# Patient Record
Sex: FEMALE | Race: WHITE | Employment: PART TIME | URBAN - METROPOLITAN AREA
[De-identification: names, ages, dates, MRNs, and addresses within clinical notes are randomized per-mention and may not be internally consistent; named-entity substitution may affect disease eponyms.]

---

## 2019-12-30 ENCOUNTER — HOSPITAL ENCOUNTER (OUTPATIENT)
Age: 29
Discharge: HOME | End: 2019-12-30
Attending: EMERGENCY MEDICINE
Payer: MEDICAID

## 2019-12-30 VITALS
DIASTOLIC BLOOD PRESSURE: 64 MMHG | HEIGHT: 62 IN | WEIGHT: 100 LBS | SYSTOLIC BLOOD PRESSURE: 107 MMHG | BODY MASS INDEX: 18.4 KG/M2 | RESPIRATION RATE: 18 BRPM | TEMPERATURE: 99 F | OXYGEN SATURATION: 95 % | HEART RATE: 63 BPM

## 2019-12-30 DIAGNOSIS — M54.16 LUMBAR RADICULOPATHY: Primary | ICD-10-CM

## 2019-12-30 PROCEDURE — 99203 OFFICE O/P NEW LOW 30 MIN: CPT

## 2019-12-30 PROCEDURE — 99204 OFFICE O/P NEW MOD 45 MIN: CPT

## 2019-12-30 RX ORDER — IBUPROFEN 600 MG/1
600 TABLET ORAL ONCE
Status: COMPLETED | OUTPATIENT
Start: 2019-12-30 | End: 2019-12-30

## 2019-12-30 RX ORDER — HYDROCODONE BITARTRATE AND ACETAMINOPHEN 5; 325 MG/1; MG/1
1-2 TABLET ORAL EVERY 6 HOURS PRN
Qty: 10 TABLET | Refills: 0 | Status: SHIPPED | OUTPATIENT
Start: 2019-12-30 | End: 2020-01-02

## 2019-12-30 RX ORDER — PREDNISONE 20 MG/1
60 TABLET ORAL DAILY
Qty: 15 TABLET | Refills: 0 | Status: SHIPPED | OUTPATIENT
Start: 2019-12-30 | End: 2020-01-04

## 2019-12-30 RX ORDER — HYDROCODONE BITARTRATE AND ACETAMINOPHEN 5; 325 MG/1; MG/1
1-2 TABLET ORAL EVERY 6 HOURS PRN
Qty: 10 TABLET | Refills: 0 | Status: SHIPPED | OUTPATIENT
Start: 2019-12-30 | End: 2019-12-30

## 2019-12-30 RX ORDER — LIDOCAINE 50 MG/G
1 PATCH TOPICAL EVERY 24 HOURS
Qty: 30 PATCH | Refills: 0 | Status: SHIPPED | OUTPATIENT
Start: 2019-12-30 | End: 2020-01-29

## 2019-12-30 RX ORDER — IBUPROFEN 600 MG/1
600 TABLET ORAL ONCE
Status: DISCONTINUED | OUTPATIENT
Start: 2019-12-30 | End: 2019-12-30

## 2019-12-30 RX ORDER — HYDROCODONE BITARTRATE AND ACETAMINOPHEN 5; 325 MG/1; MG/1
2 TABLET ORAL ONCE
Status: COMPLETED | OUTPATIENT
Start: 2019-12-30 | End: 2019-12-30

## 2019-12-30 RX ORDER — LIDOCAINE 50 MG/G
1 PATCH TOPICAL EVERY 24 HOURS
Qty: 30 PATCH | Refills: 0 | Status: SHIPPED | OUTPATIENT
Start: 2019-12-30 | End: 2019-12-30

## 2019-12-30 RX ORDER — PREDNISONE 20 MG/1
60 TABLET ORAL DAILY
Qty: 15 TABLET | Refills: 0 | Status: SHIPPED | OUTPATIENT
Start: 2019-12-30 | End: 2019-12-30

## 2019-12-30 RX ORDER — HYDROCODONE BITARTRATE AND ACETAMINOPHEN 5; 325 MG/1; MG/1
2 TABLET ORAL ONCE
Status: DISCONTINUED | OUTPATIENT
Start: 2019-12-30 | End: 2019-12-30

## 2019-12-30 RX ORDER — CYCLOBENZAPRINE HCL 10 MG
10 TABLET ORAL 3 TIMES DAILY PRN
Qty: 20 TABLET | Refills: 0 | Status: SHIPPED | OUTPATIENT
Start: 2019-12-30 | End: 2020-01-09

## 2019-12-30 RX ORDER — CYCLOBENZAPRINE HCL 10 MG
10 TABLET ORAL 3 TIMES DAILY PRN
Qty: 20 TABLET | Refills: 0 | Status: SHIPPED | OUTPATIENT
Start: 2019-12-30 | End: 2019-12-30

## 2019-12-30 NOTE — ED PROVIDER NOTES
Patient Seen in: [facility]      History   Patient presents with:  Back Pain    Stated Complaint: Severe back pain (unable to move)    HPI    Patient is a 56-year-old female comes the ED for evaluation of low back pain.   Pa discomfort due to pain  LUNGS: Clear to auscultation bilaterally  CARDIOVASCULAR: Regular rate and rhythm, normal S1-S2  ABDOMINAL: Soft, nontender, nondistended  SKIN: Warm and dry  Back: Patient has some mild midline tenderness at L4/L5.   She has left-si determined, within reasonable clinical confidence and prior to discharge, that an emergency medical condition was not or was no longer present. There was no indication for further evaluation, treatment or admission on an emergency basis.   Comprehensive ve

## 2019-12-30 NOTE — ED INITIAL ASSESSMENT (HPI)
States she was at work yesterday carrying a drawer of money and when she put the drawer down, she felt a tightness in her lower back. L side > R side. C/o intermittent left leg numbness when she tries to walk. States her leg goes \"dead\".

## 2020-07-16 ENCOUNTER — TELEPHONE (OUTPATIENT)
Dept: SCHEDULING | Age: 30
End: 2020-07-16

## 2022-11-14 ENCOUNTER — APPOINTMENT (OUTPATIENT)
Dept: URGENT CARE | Age: 32
End: 2022-11-14

## 2022-11-30 ENCOUNTER — HOSPITAL ENCOUNTER (OUTPATIENT)
Age: 32
Discharge: HOME | End: 2022-11-30
Payer: COMMERCIAL

## 2022-11-30 VITALS
HEART RATE: 60 BPM | OXYGEN SATURATION: 98 % | RESPIRATION RATE: 16 BRPM | DIASTOLIC BLOOD PRESSURE: 78 MMHG | TEMPERATURE: 99 F | SYSTOLIC BLOOD PRESSURE: 106 MMHG

## 2022-11-30 DIAGNOSIS — K08.89 PAIN, DENTAL: Primary | ICD-10-CM

## 2022-11-30 PROCEDURE — 99203 OFFICE O/P NEW LOW 30 MIN: CPT | Performed by: NURSE PRACTITIONER

## 2022-11-30 RX ORDER — HYDROCODONE BITARTRATE AND ACETAMINOPHEN 5; 325 MG/1; MG/1
1 TABLET ORAL EVERY 6 HOURS PRN
Qty: 10 TABLET | Refills: 0 | Status: SHIPPED | OUTPATIENT
Start: 2022-11-30

## 2022-11-30 RX ORDER — NAPROXEN 500 MG/1
500 TABLET ORAL 2 TIMES DAILY PRN
Qty: 20 TABLET | Refills: 0 | Status: SHIPPED | OUTPATIENT
Start: 2022-11-30 | End: 2022-12-07

## 2022-11-30 RX ORDER — CLINDAMYCIN HYDROCHLORIDE 300 MG/1
300 CAPSULE ORAL 3 TIMES DAILY
Qty: 30 CAPSULE | Refills: 0 | Status: SHIPPED | OUTPATIENT
Start: 2022-11-30 | End: 2022-12-10

## 2022-11-30 NOTE — ED INITIAL ASSESSMENT (HPI)
Pt has a cracked tooth to her right lower jaw. Tooth #30. Pt has had this for years and needs a root canal. Pain started yesterday. Pt can't close her mouth or eat due to pain. Seeing a dentist this weekend.

## 2024-02-28 ENCOUNTER — HOSPITAL ENCOUNTER (OUTPATIENT)
Age: 34
Discharge: LEFT BEFORE DISCHARGE | End: 2024-02-28
Payer: COMMERCIAL

## 2024-03-04 ENCOUNTER — HOSPITAL ENCOUNTER (OUTPATIENT)
Age: 34
Discharge: HOME | End: 2024-03-04
Payer: COMMERCIAL

## 2024-03-04 VITALS
HEIGHT: 62 IN | TEMPERATURE: 98 F | DIASTOLIC BLOOD PRESSURE: 80 MMHG | SYSTOLIC BLOOD PRESSURE: 116 MMHG | WEIGHT: 100 LBS | HEART RATE: 90 BPM | OXYGEN SATURATION: 97 % | RESPIRATION RATE: 16 BRPM | BODY MASS INDEX: 18.4 KG/M2

## 2024-03-04 DIAGNOSIS — J11.1 INFLUENZA: ICD-10-CM

## 2024-03-04 DIAGNOSIS — R50.9 FEVER, UNSPECIFIED FEVER CAUSE: Primary | ICD-10-CM

## 2024-03-04 LAB
POCT INFLUENZA A: NEGATIVE
POCT INFLUENZA B: POSITIVE
SARS-COV-2 RNA RESP QL NAA+PROBE: NOT DETECTED

## 2024-03-04 PROCEDURE — 87502 INFLUENZA DNA AMP PROBE: CPT | Performed by: NURSE PRACTITIONER

## 2024-03-04 PROCEDURE — U0002 COVID-19 LAB TEST NON-CDC: HCPCS | Performed by: NURSE PRACTITIONER

## 2024-03-04 PROCEDURE — 99213 OFFICE O/P EST LOW 20 MIN: CPT | Performed by: NURSE PRACTITIONER

## 2024-03-04 RX ORDER — ETONOGESTREL AND ETHINYL ESTRADIOL VAGINAL RING .015; .12 MG/D; MG/D
1 RING VAGINAL
COMMUNITY

## 2024-03-04 NOTE — ED PROVIDER NOTES
Patient Seen in: [facility]      History     Chief Complaint   Patient presents with    Cough     Entered by patient     Stated Complaint: Cough    Subjective:   HPI    Patient is a 34-year-old female who is here today with complaints of cough, congestion, fevers for the past 5 days.  Reports that her son was positive for influenza B.  She wanted to be tested for COVID and influenza.  Patient reports having some positive COVID exposures from work.    Objective:   History reviewed. No pertinent past medical history.           History reviewed. No pertinent surgical history.             Social History     Socioeconomic History    Marital status: Single   Tobacco Use    Smoking status: Every Day    Smokeless tobacco: Never   Vaping Use    Vaping Use: Every day              Review of Systems    Positive for stated complaint: Cough  Other systems are as noted in HPI.  Constitutional and vital signs reviewed.      All other systems reviewed and negative except as noted above.    Physical Exam     ED Triage Vitals [03/04/24 1600]   /80   Pulse 90   Resp 16   Temp 97.9 °F (36.6 °C)   Temp src Temporal   SpO2 97 %   O2 Device None (Room air)       Current:/80   Pulse 90   Temp 97.9 °F (36.6 °C) (Temporal)   Resp 16   Ht 157.5 cm (5' 2\")   Wt 45.4 kg   LMP 02/13/2024   SpO2 97%   BMI 18.29 kg/m²         Physical Exam    Adult physical exam:     VS: Vital signs reviewed. O2 saturation within normal limits for this patient     General: Patient is awake and alert, oriented to person, place and time. Not in acute distress.      HEENT: Head is normocephalic atraumatic. Pupils reactive bilaterally.  EOMs intact.     Lungs: good inspiratory effort. +air entry bilaterally without wheezes, rhonchi, crackles.  No accessory muscle use or tachypnea.       Extremities: No edema.  Pulses 2+ extremities.   Brisk capillary refill noted.      Skin: Normal skin turgor     CNS: Moves all 4 extremities.   Interacts appropriately.  No tremor.  No gait abnormality      ED Course     Labs Reviewed   POCT FLU TEST - Abnormal; Notable for the following components:       Result Value    POCT INFLUENZA B Positive (*)     All other components within normal limits    Narrative:     This assay is a rapid molecular in vitro test utilizing nucleic acid amplification of influenza A and B viral RNA.   RAPID SARS-COV-2 BY PCR - Normal          I have personally  reviewed available prior medical records for any recent pertinent discharge summaries/testing. Patient/family updated on results and plan, a verbalized understanding and agreement with the plan.  I explained to the patient that emergent conditions may arise and to go to the ER for new, worsening or any persistent conditions. I've explained the importance of taking all medicatons as prescribed, follow up, and return precuations,  All questions answered.    Please note that this report has been produced using speech recognition software and may contain errors related to that system including, but not limited to, errors in grammar, punctuation, and spelling, as well as words and phrases that possibly may have been recognized inappropriately.  If there are any questions or concerns, contact the dictating provider for clarification.         MDM      Patient presents with flulike symptoms.  Positive fevers, body aches, sinus congestion, cough and malaise.  Positive sick contacts at home.  No respiratory distress, oxygen saturation within normal limits for the patient.  . Patient is febrile but is nontoxic and does not meet SIRS criteria.  Tolerating oral intake, does not appear clinically dehydrated. Influenza test is positive. Not immunocompromised. No underlying pulmonary diseases. Not within the treatment window for Tamiflu.  Recommend follow-up with PCP as needed. Counseled on supportive care. Return to ED precautions discussed with the patient                                      Medical Decision Making      Disposition and Plan     Clinical Impression:  1. Fever, unspecified fever cause    2. Influenza         Disposition:  Discharge  3/4/2024  4:33 pm    Follow-up:  No follow-up provider specified.        Medications Prescribed:  Current Discharge Medication List

## 2024-03-04 NOTE — ED INITIAL ASSESSMENT (HPI)
Pt with cough since 2/28. Intermittent fever, ha Recently exposed to coworkers that are covid positive and son that was influenza positive.

## 2024-03-04 NOTE — DISCHARGE INSTRUCTIONS
-Acetaminophen 650 mg orally every 4 hours as needed for pain.  Do not exceed 4000 mg in 24 hours.     -Ibuprofen 600 mg orally every 6 hours as needed for pain.  Take with food.  Discontinue use and seek medical attention with blood in vomit or stool, coffee-ground vomit, or dark black stool.     -Use medications for minimal duration necessary.     -Over-the-counter nasal saline.  1-2 drops to the affected nostril as needed.       -Cool air humidifier.     -See teaching materials on influenza (packet printed).     -Follow-up with the primary care provider within 3 days for reassessment or sooner if necessary.     -Please monitor for and seek medical attention with fever lasting more than 48 hours, difficulties breathing, increased work of breathing, shortness of breath, chset pain, abdominal pain, persistent vomiting and diarrhea with concerns for dehydration, or any other concerns.

## 2025-01-15 ENCOUNTER — APPOINTMENT (OUTPATIENT)
Dept: GENERAL RADIOLOGY | Age: 35
End: 2025-01-15
Attending: NURSE PRACTITIONER
Payer: COMMERCIAL

## 2025-01-15 ENCOUNTER — HOSPITAL ENCOUNTER (OUTPATIENT)
Age: 35
Discharge: HOME | End: 2025-01-15
Payer: COMMERCIAL

## 2025-01-15 VITALS
DIASTOLIC BLOOD PRESSURE: 66 MMHG | SYSTOLIC BLOOD PRESSURE: 100 MMHG | RESPIRATION RATE: 18 BRPM | OXYGEN SATURATION: 98 % | HEART RATE: 98 BPM | TEMPERATURE: 98 F

## 2025-01-15 DIAGNOSIS — R05.1 ACUTE COUGH: ICD-10-CM

## 2025-01-15 DIAGNOSIS — J10.1 INFLUENZA A: Primary | ICD-10-CM

## 2025-01-15 LAB
POCT INFLUENZA A: POSITIVE
POCT INFLUENZA B: NEGATIVE

## 2025-01-15 PROCEDURE — 71046 X-RAY EXAM CHEST 2 VIEWS: CPT | Performed by: NURSE PRACTITIONER

## 2025-01-15 RX ORDER — GUAIFENESIN 600 MG/1
1200 TABLET, EXTENDED RELEASE ORAL 2 TIMES DAILY
Qty: 20 TABLET | Refills: 0 | Status: SHIPPED | OUTPATIENT
Start: 2025-01-15 | End: 2025-01-20

## 2025-01-15 RX ORDER — ALBUTEROL SULFATE 90 UG/1
2 INHALANT RESPIRATORY (INHALATION) EVERY 4 HOURS PRN
Qty: 1 EACH | Refills: 0 | Status: SHIPPED | OUTPATIENT
Start: 2025-01-15 | End: 2025-02-14

## 2025-01-15 RX ORDER — BENZONATATE 100 MG/1
100 CAPSULE ORAL 3 TIMES DAILY PRN
Qty: 30 CAPSULE | Refills: 0 | Status: SHIPPED | OUTPATIENT
Start: 2025-01-15 | End: 2025-02-14

## 2025-01-15 NOTE — ED INITIAL ASSESSMENT (HPI)
Cough, congestion, sore throat since 12/26. Now having fevers the last few days. Last night, temp was 101.

## 2025-01-15 NOTE — DISCHARGE INSTRUCTIONS
Positive for Influenza A.   Chest Xray is normal.     - Tessalon 1 capusule every 8 hours as needed.  - Take plain mucinex - 1200mg twice a day with a big glass of water  Albuterol: 2 puffs every 4-6 hours if needed for wheezing.   - Nasal saline - either spray (\"Ocean\" brand) or Candelario Med Sinus Rinse 3 times a day  Flonase: 2 sprays to each nostril in the AM.  - Rest and push fluids  - Hot lemon tea with honey  - Steam twice a day (either in shower or with cup of hot water and a towel over your head)     Please follow up with your primary care doctor in 7 days if not improving.

## 2025-01-16 NOTE — ED PROVIDER NOTES
Patient Seen in: [facility]      History     Chief Complaint   Patient presents with    Cough/URI    Sore Throat    Nasal Congestion     Stated Complaint: cough,congestion,sore throat    Subjective:   35-year-old female presents with complaint of cough, congestion, sore throat that began 3 weeks ago. Symptoms have waxed and waned. Endorses over the last 3 days fevers (tmax 101) have developed.    All family members with Influenza A.   No hx of asthma.   OTC's include Tylenol, Dayquil, Nyquil.     Pt denies shortness of breath, sinus pressure, difficulty swallowing, chest pain, nausea, vomiting, or diarrhea.     The history is provided by the patient.       Objective:     History reviewed. No pertinent past medical history.           History reviewed. No pertinent surgical history.             Social History     Socioeconomic History    Marital status: Single   Tobacco Use    Smoking status: Every Day    Smokeless tobacco: Never   Vaping Use    Vaping status: Every Day     Social Drivers of Health      Received from [facility], [facility]    Housing Stability              Review of Systems   Constitutional:  Positive for fever. Negative for chills and diaphoresis.   HENT:  Positive for congestion, rhinorrhea and sore throat. Negative for trouble swallowing.    Respiratory:  Positive for cough. Negative for shortness of breath and wheezing.    Cardiovascular:  Negative for chest pain.   Gastrointestinal:  Negative for abdominal pain, diarrhea, nausea and vomiting.   Skin:  Negative for rash.       Positive for stated complaint: cough,congestion,sore throat  Other systems are as noted in HPI.  Constitutional and vital signs reviewed.      All other systems reviewed and negative except as noted above.    Physical Exam     ED Triage Vitals [01/15/25 1143]   /66   Pulse 98   Resp 18   Temp 98.4 °F (36.9 °C)   Temp src Oral   SpO2 98 %   O2 Device None (Room air)        Current Vitals:   Vital Signs  BP: 100/66  Pulse: 98  Resp: 18  Temp: 98.4 °F (36.9 °C)  Temp src: Oral    Oxygen Therapy  SpO2: 98 %  O2 Device: None (Room air)        Physical Exam  Vitals and nursing note reviewed.   Constitutional:       General: She is not in acute distress.     Appearance: Normal appearance. She is not ill-appearing, toxic-appearing or diaphoretic.   HENT:      Head: Normocephalic.      Right Ear: Tympanic membrane normal. Tympanic membrane is not erythematous.      Left Ear: Tympanic membrane normal. Tympanic membrane is not erythematous.      Nose: Congestion and rhinorrhea present. No nasal tenderness or mucosal edema.      Right Sinus: No maxillary sinus tenderness or frontal sinus tenderness.      Left Sinus: No maxillary sinus tenderness or frontal sinus tenderness.      Mouth/Throat:      Mouth: Mucous membranes are moist.      Pharynx: Uvula midline. No pharyngeal swelling, posterior oropharyngeal erythema or uvula swelling.      Tonsils: No tonsillar exudate or tonsillar abscesses.   Eyes:      Conjunctiva/sclera: Conjunctivae normal.   Cardiovascular:      Rate and Rhythm: Normal rate and regular rhythm.   Pulmonary:      Effort: Pulmonary effort is normal. No respiratory distress.      Breath sounds: Normal breath sounds. No stridor. No wheezing, rhonchi or rales.   Musculoskeletal:      Cervical back: Normal range of motion and neck supple.   Lymphadenopathy:      Cervical: No cervical adenopathy.   Skin:     General: Skin is warm and dry.   Neurological:      Mental Status: She is alert.   Psychiatric:         Mood and Affect: Mood normal.             ED Course     Labs Reviewed   POCT FLU TEST - Abnormal; Notable for the following components:       Result Value    POCT INFLUENZA A Positive (*)     All other components within normal limits    Narrative:     This assay is a rapid molecular in vitro test utilizing nucleic acid amplification of influenza A and B viral RNA.       MDM        Medical Decision Making  35-year-old female presents with complaint of cough, congestion, sore throat that began 3 weeks ago. Over the last 3 days developed fevers (tmax 101).   Diff dx include Flu, Covid, other viral URI, pneumonia.   On exam, pt in NAD, vitals normal. No sinus pressure, no AOM.   CXR without acute findings.   Influenza A positive.  Will treat for viral URI/Influenza with Mucinex, Tessalon, Albuterol, steam, push fluids, Tylenol/Motrin as directed.   Follow up with PCP if not improving in the next week.   Pt agreeable to plan.       Amount and/or Complexity of Data Reviewed  External Data Reviewed: labs and notes.  Labs: ordered.  Radiology: ordered.    Risk  OTC drugs.  Prescription drug management.        Disposition and Plan     Clinical Impression:  1. Influenza A    2. Acute cough         Disposition:  Discharge  1/15/2025 12:54 pm    Follow-up:  No follow-up provider specified.        Medications Prescribed:  Discharge Medication List as of 1/15/2025 12:55 PM        START taking these medications    Details   benzonatate 100 MG Oral Cap Take 1 capsule (100 mg total) by mouth 3 (three) times daily as needed for cough., Normal, Disp-30 capsule, R-0      guaiFENesin ER (MUCINEX) 600 MG Oral Tablet 12 Hr Take 2 tablets (1,200 mg total) by mouth 2 (two) times daily for 5 days., Normal, Disp-20 tablet, R-0      albuterol 108 (90 Base) MCG/ACT Inhalation Aero Soln Inhale 2 puffs into the lungs every 4 (four) hours as needed for Wheezing., Normal, Disp-1 each, R-0                 Supplementary Documentation:

## 2025-02-19 ENCOUNTER — HOSPITAL ENCOUNTER (OUTPATIENT)
Age: 35
Discharge: HOME | End: 2025-02-19
Payer: COMMERCIAL

## 2025-02-19 VITALS
RESPIRATION RATE: 16 BRPM | OXYGEN SATURATION: 97 % | DIASTOLIC BLOOD PRESSURE: 73 MMHG | TEMPERATURE: 98 F | SYSTOLIC BLOOD PRESSURE: 119 MMHG | HEART RATE: 66 BPM

## 2025-02-19 DIAGNOSIS — H60.502 ACUTE OTITIS EXTERNA OF LEFT EAR, UNSPECIFIED TYPE: Primary | ICD-10-CM

## 2025-02-19 PROCEDURE — 99213 OFFICE O/P EST LOW 20 MIN: CPT | Performed by: NURSE PRACTITIONER

## 2025-02-19 RX ORDER — TRIAMCINOLONE ACETONIDE 1 MG/G
OINTMENT TOPICAL 2 TIMES DAILY
COMMUNITY
Start: 2023-04-04

## 2025-02-19 RX ORDER — NEOMYCIN SULFATE, POLYMYXIN B SULFATE, HYDROCORTISONE 3.5; 10000; 1 MG/ML; [USP'U]/ML; MG/ML
4 SOLUTION/ DROPS AURICULAR (OTIC) 4 TIMES DAILY
Qty: 10 ML | Refills: 0 | Status: SHIPPED | OUTPATIENT
Start: 2025-02-19 | End: 2025-02-26

## 2025-02-19 NOTE — ED INITIAL ASSESSMENT (HPI)
Pt with left ear pain. Was removing wax and and doing ear wax softening with peroxide water and olive oil using qtips. Got some out of the ear but now having pain in the ear and jaw/neck. Happened yesterday.

## 2025-02-19 NOTE — ED PROVIDER NOTES
Patient Seen in: [facility]      History     Chief Complaint   Patient presents with    Ear Problem     Entered by patient     Stated Complaint: Ear Problem    Subjective:   35-year-old female presents today with pain to the ear that started after cleaning her ear with peroxide and then a Q-tip.  Denies have any URI symptoms.  No dizziness no nausea vomiting.  No headache.  Alert oriented x 3.  No other symptoms or concerns.  The patient's medication list, past medical history and social history elements as listed in today's nurse's notes were reviewed and agreed (except as otherwise stated in the HPI).  The patient's family history reviewed and determined to be noncontributory to the presenting problem              Objective:     History reviewed. No pertinent past medical history.           History reviewed. No pertinent surgical history.             Social History     Socioeconomic History    Marital status: Single   Tobacco Use    Smoking status: Every Day    Smokeless tobacco: Never   Vaping Use    Vaping status: Every Day     Social Drivers of Health      Received from [facility], [facility]    Housing Stability              Review of Systems    Positive for stated complaint: Ear Problem  Other systems are as noted in HPI.  Constitutional and vital signs reviewed.      All other systems reviewed and negative except as noted above.    Physical Exam     ED Triage Vitals [02/19/25 1723]   /73   Pulse 66   Resp 16   Temp 98.2 °F (36.8 °C)   Temp src Oral   SpO2 97 %   O2 Device None (Room air)       Current Vitals:   Vital Signs  BP: 119/73  Pulse: 66  Resp: 16  Temp: 98.2 °F (36.8 °C)  Temp src: Oral    Oxygen Therapy  SpO2: 97 %  O2 Device: None (Room air)        Physical Exam  Vitals and nursing note reviewed.   Constitutional:       Appearance: Normal appearance.   HENT:      Head: Normocephalic.      Right Ear: Tympanic membrane and ear canal normal.       Left Ear: Swelling and tenderness present. A middle ear effusion is present.      Ears:      Comments: Mild redness to the outer ear canal.  Cardiovascular:      Rate and Rhythm: Normal rate.   Pulmonary:      Effort: Pulmonary effort is normal.   Skin:     General: Skin is warm and dry.   Neurological:      Mental Status: She is alert and oriented to person, place, and time.             ED Course   Labs Reviewed - No data to display                MDM     Please note that this report has been produced using speech recognition software and may contain errors related to that system including, but not limited to, errors in grammar, punctuation, and spelling, as well as words and phrases that possibly may have been recognized inappropriately.  If there are any questions or concerns, contact the dictating provider for clarification.              Medical Decision Making  Differential diagnosis includes but is not limited to: Otitis media, otitis externa, strep throat, eustachian tube dysfunction, mastoiditis, TMJ      Presents today complaints of pain to the left ear after cleaning with peroxide and then a Q-tip.  On exam does have some erythema swelling to the outer ear canal and possible fluid behind the TM.  Also has cerumen remaining in the outer ear canal.  Will treat with neomycin OT for possible outer ear infection causing her symptoms.  To take over-the-counter NSAID Tylenol for pain and swelling.  Encouraged follow with her primary care physician in 1 week if symptoms do not improve.  Follow-up sooner with any worsening of symptoms.  Patient verbalized understanding and agreed to plan of care.    Risk  OTC drugs.  Prescription drug management.        Disposition and Plan     Clinical Impression:  1. Acute otitis externa of left ear, unspecified type         Disposition:  Discharge  2/19/2025  5:37 pm    Follow-up:  Juanita Tiwari MD  [address] [facility] [address]  [phone]    In 1  week  As needed          Medications Prescribed:  Current Discharge Medication List        START taking these medications    Details   NEOMYCIN-POLYMYXIN-HC 1 % Otic Solution Place 4 drops in ear(s) 4 (four) times daily for 7 days.  Qty: 10 mL, Refills: 0                 Supplementary Documentation:

## 2025-03-19 ENCOUNTER — HOSPITAL ENCOUNTER (OUTPATIENT)
Age: 35
Discharge: HOME | End: 2025-03-19
Payer: COMMERCIAL

## 2025-03-19 VITALS
RESPIRATION RATE: 16 BRPM | DIASTOLIC BLOOD PRESSURE: 63 MMHG | OXYGEN SATURATION: 100 % | HEIGHT: 62 IN | SYSTOLIC BLOOD PRESSURE: 122 MMHG | BODY MASS INDEX: 19.51 KG/M2 | TEMPERATURE: 98 F | HEART RATE: 70 BPM | WEIGHT: 106 LBS

## 2025-03-19 DIAGNOSIS — R21 RASH AND OTHER NONSPECIFIC SKIN ERUPTION: Primary | ICD-10-CM

## 2025-03-19 PROCEDURE — 99213 OFFICE O/P EST LOW 20 MIN: CPT | Performed by: NURSE PRACTITIONER

## 2025-03-19 RX ORDER — CEFDINIR 300 MG/1
300 CAPSULE ORAL 2 TIMES DAILY
Qty: 14 CAPSULE | Refills: 0 | Status: SHIPPED | OUTPATIENT
Start: 2025-03-19 | End: 2025-03-26

## 2025-03-19 RX ORDER — PREDNISONE 20 MG/1
40 TABLET ORAL DAILY
Qty: 10 TABLET | Refills: 0 | Status: SHIPPED | OUTPATIENT
Start: 2025-03-19 | End: 2025-03-24

## 2025-03-19 NOTE — ED PROVIDER NOTES
Patient Seen in: [facility]      History     Chief Complaint   Patient presents with    Allergic Rxn Allergies    Rash Skin Problem     Stated Complaint: poss. spider bite on face    Subjective:   35-year-old female who presents with possible insect bite to the left side temple.  States she has developed a rash since.  States the bite took place yesterday around 6 PM.  No trouble breathing.  No chest tightness.              Objective:     No pertinent past medical history.            No pertinent past surgical history.              No pertinent social history.            Review of Systems   Constitutional: Negative.    Respiratory: Negative.     Skin:  Positive for rash.   All other systems reviewed and are negative.      Positive for stated complaint: poss. spider bite on face  Other systems are as noted in HPI.  Constitutional and vital signs reviewed.      All other systems reviewed and negative except as noted above.    Physical Exam     ED Triage Vitals [03/19/25 1457]   /63   Pulse 70   Resp 16   Temp 98.4 °F (36.9 °C)   Temp src Oral   SpO2 100 %   O2 Device None (Room air)       Current Vitals:   Vital Signs  BP: 122/63  Pulse: 70  Resp: 16  Temp: 98.4 °F (36.9 °C)  Temp src: Oral    Oxygen Therapy  SpO2: 100 %  O2 Device: None (Room air)        Physical Exam  Vitals and nursing note reviewed.   Constitutional:       General: She is not in acute distress.     Appearance: Normal appearance. She is not ill-appearing, toxic-appearing or diaphoretic.   HENT:      Head:      Jaw: No trismus.      Mouth/Throat:      Lips: Pink. No lesions.      Mouth: Mucous membranes are moist. No oral lesions or angioedema.      Tongue: No lesions.      Palate: No lesions.      Pharynx: Oropharynx is clear. Uvula midline.   Cardiovascular:      Rate and Rhythm: Normal rate and regular rhythm.      Pulses: Normal pulses.      Heart sounds: Normal heart sounds.   Pulmonary:      Effort: Pulmonary effort is  normal. No respiratory distress.      Breath sounds: Normal breath sounds. No stridor. No wheezing, rhonchi or rales.   Skin:     General: Skin is warm and dry.      Coloration: Skin is not jaundiced or pale.      Findings: Rash present.      Comments: Facial rash, not erythematous, not streaking.  No vesicular lesions.   Neurological:      Mental Status: She is alert and oriented to person, place, and time.   Psychiatric:         Behavior: Behavior normal.             ED Course   Labs Reviewed - No data to display                MDM              Medical Decision Making  Differential diagnosis initially included but was not limited to: Allergic reaction, cellulitis    Nontoxic adult female patient in no respiratory distress.  No angioedema or other signs of anaphylaxis.  No respiratory distress.  No adventitious breath sounds.    Supportive/home management of diagnosis/illness/injury discussed. Red flag symptoms discussed.  Signs and symptoms/criteria that would necessitate reevaluation, including ER evaluation discussed.  Patient and/or responsible adult verbalize and agree with management and plan of care.    Speech recognition software was used during this dictation.  There may be minor errors in transcription.      Risk  OTC drugs.  Prescription drug management.        Disposition and Plan     Clinical Impression:  1. Rash and other nonspecific skin eruption         Disposition:  Discharge  3/19/2025  3:34 pm    Follow-up:  No follow-up provider specified.        Medications Prescribed:  Current Discharge Medication List        START taking these medications    Details   cefdinir 300 MG Oral Cap Take 1 capsule (300 mg total) by mouth 2 (two) times daily for 7 days.  Qty: 14 capsule, Refills: 0      predniSONE 20 MG Oral Tab Take 2 tablets (40 mg total) by mouth daily for 5 days.  Qty: 10 tablet, Refills: 0                 Supplementary Documentation:

## 2025-03-19 NOTE — DISCHARGE INSTRUCTIONS
Take the medications as prescribed.  Over-the-counter Benadryl 50 mg every 8 hours as needed for itching.  Over-the-counter Zyrtec 10 mg daily, do this for the next 7 days.    Call your primary care provider to arrange a follow-up appointment.    Go to the emergency department for new or worsening symptoms, including facial swelling, throat, chest tightness, shortness of breath, fevers, vomiting

## 2025-03-19 NOTE — ED INITIAL ASSESSMENT (HPI)
Pt with bite to the left temple last night at 2200. At 3am pt awoke to itching face. At 6am pt got up and noted face to be red and itching. Pt has taken benadryl 1 pediatric chewable at 7am and is applying benadryl lotion without relief.

## (undated) NOTE — LETTER
Date & Time: 3/4/2024, 4:36 PM  Patient: Dwayne Senior  Encounter Provider(s):    Bhargavi Banda APRN       To Whom It May Concern:    Dwayne Senior was seen and treated in our department on 3/4/2024. She can return to work once she is fever free for 24 hours with no fever reducing medication.    If you have any questions or concerns, please do not hesitate to call.        _____________________________  Physician/APC Signature